# Patient Record
(demographics unavailable — no encounter records)

---

## 2024-11-13 NOTE — DATA REVIEWED
[FreeTextEntry1] : I reviewed and interpreted the sleep study results and reviewed them with the patient and family My interpretation is in keeping with  Sleep apnea present: yes Type: Predominantly obstructive  Degree:severe, AHI 15

## 2024-11-13 NOTE — CONSULT LETTER
[Dear  ___] : Dear  [unfilled], [Consult Letter:] : I had the pleasure of evaluating your patient, [unfilled]. [Consult Closing:] : Thank you very much for allowing me to participate in the care of this patient.  If you have any questions, please do not hesitate to contact me. [Sincerely,] : Sincerely, [FreeTextEntry2] : Micha Arora MD [FreeTextEntry3] : Nguyễn Acosta MD      Pediatric Otolaryngology      [address] [address]      Tel [phone]      Fax [phone]

## 2024-11-13 NOTE — HISTORY OF PRESENT ILLNESS
[No Personal or Family History of Easy Bruising, Bleeding, or Issues with General Anesthesia] : No Personal or Family History of easy bruising, bleeding, or issues with general anesthesia [No change in the review of systems as noted in prior visit date ___] : No change in the review of systems as noted in prior visit date of [unfilled] [de-identified] : 2 year old male here for f/u visit for snoring/sleep apnea (third opinion)  Seen by Dr. Grover in August, 2024,  prior  Hx of ETD, audiogram 5/15/24 WNL with tymps type C AD, type A AS History of snoring every night for 3-4 months Witnessed pausing while sleeping. No choking or gasping Remains restless and wakes at night Mouth breathing No daytime symptoms Trial of Flonase x few weeks with no improvement in symptoms Sleep study performed recently No recent ear infections No concerns for hearing or speech Passed [facility]   [de-identified] : August 2024

## 2024-11-13 NOTE — PHYSICAL EXAM
[Exposed Vessel] : left anterior vessel not exposed [Clear to Auscultation] : lungs were clear to auscultation bilaterally [Wheezing] : no wheezing [Increased Work of Breathing] : no increased work of breathing with use of accessory muscles and retractions [Normal Gait and Station] : normal gait and station [Normal muscle strength, symmetry and tone of facial, head and neck musculature] : normal muscle strength, symmetry and tone of facial, head and neck musculature [Normal] : no cervical lymphadenopathy [de-identified] : effusion

## 2025-03-05 NOTE — ASSESSMENT
[FreeTextEntry1] : Rash  new diagnosis of uncertain prognosis favor viral exanthem if persistent, ddx includes [place] vs LP vs PLC vs PsO vs other start triamcinolone 0.1% ointment BID to AA on body PRN roughness, SED  RTC 2 mo if not improved by then  Xerosis dry skin care reviewed, handout provided. Switch to recommended products in handout and moisturize liberally. recommend applying liberal amounts of plain vaseline to cheeks, especially before and after meals avoid wipes to the face after meals can cleanse with water and pat dry with a clean towel/cloth  #pruritus on occiput ok to use HC lotion BID prn pruritus, SED  NOT DISCUSSED #pigmentary mosaicism with #CALM # Melanocytic nevi brown patches on trunk and arm may represent pigmentary mosaicism, large cafe au lait macule (can be segmental or broad as seen with Uday Addison Sx), genetic testing for Legius and NF1 negative Father has multiple dark appearing nevi on his hands (photo in chart) so there may be genetic predisposition Favor continue observation at this time parents to notify if any changes or more prominent features arise clinical and dermoscopic photos in chart  # congenital dermal melanocytosis Discussed nature, chronicity and unpredictable course Reassured

## 2025-03-05 NOTE — CONSULT LETTER
[Dear  ___] : Dear  [unfilled], [Consult Letter:] : I had the pleasure of evaluating your patient, [unfilled]. [Please see my note below.] : Please see my note below. [Consult Closing:] : Thank you very much for allowing me to participate in the care of this patient.  If you have any questions, please do not hesitate to contact me. [Sincerely,] : Sincerely, [FreeTextEntry3] : Michelle Grider MD Department of Dermatology [facility]

## 2025-03-05 NOTE — PHYSICAL EXAM
[FreeTextEntry3] : numerous skin colored papules on trunk, extremities, few on cheeks xerosis occiput clear  brown patches on trunk and upper L arm  w some darker brown patches and macules within multiple (>6) cafe au lait macules on trunk and extremities blue grey macules on buttock and L upper shoulder.

## 2025-03-05 NOTE — ASSESSMENT
[FreeTextEntry1] : Rash  new diagnosis of uncertain prognosis favor viral exanthem if persistent, ddx includes [place] vs LP vs PLC vs PsO vs other start triamcinolone 0.1% ointment BID to AA on body PRN roughness, SED  RTC 2 mo if not improved by then  Xerosis dry skin care reviewed, handout provided. Switch to recommended products in handout and moisturize liberally. recommend applying liberal amounts of plain vaseline to cheeks, especially before and after meals avoid wipes to the face after meals can cleanse with water and pat dry with a clean towel/cloth  #pruritus on occiput ok to use HC lotion BID prn pruritus, SED  NOT DISCUSSED #pigmentary mosaicism with #CALM # Melanocytic nevi brown patches on trunk and arm may represent pigmentary mosaicism, large cafe au lait macule (can be segmental or broad as seen with Uday [place] Sx), genetic testing for Legius and NF1 negative Father has multiple dark appearing nevi on his hands (photo in chart) so there may be genetic predisposition Favor continue observation at this time parents to notify if any changes or more prominent features arise clinical and dermoscopic photos in chart  # congenital dermal melanocytosis Discussed nature, chronicity and unpredictable course Reassured

## 2025-03-05 NOTE — CONSULT LETTER
[Dear  ___] : Dear  [unfilled], [Consult Letter:] : I had the pleasure of evaluating your patient, [unfilled]. [Please see my note below.] : Please see my note below. [Consult Closing:] : Thank you very much for allowing me to participate in the care of this patient.  If you have any questions, please do not hesitate to contact me. [Sincerely,] : Sincerely, [FreeTextEntry3] : Michelle Grider MD Department of Dermatology [facility]

## 2025-03-05 NOTE — HISTORY OF PRESENT ILLNESS
[FreeTextEntry1] : np rash [de-identified] : Referred by: Dr. Arora   Mr. NOAH MUKHERJEE  is a 2 year old M here for evaluation of below  #rash since last thurs, asx, maybe slowly improving home, no  not sick recently, no fever, no rhinorrhea, cough  #dry skin on cheeks, itchy on back of head  S: dr kapoor M:  aquaphor D: gain, no fs/ds/wb/fb   no personal or family h/o skin cancer derm hx: brown spots on body since birth, developing well. No new lesions. Developing normally- meeting milestones per parents had genetic testing for neurofibromatosis and Legius which were negative

## 2025-03-05 NOTE — HISTORY OF PRESENT ILLNESS
[FreeTextEntry1] : np rash [de-identified] : Referred by: Dr. Arora   Mr. NOAH MUKHERJEE  is a 2 year old M here for evaluation of below  #rash since last thurs, asx, maybe slowly improving home, no  not sick recently, no fever, no rhinorrhea, cough  #dry skin on cheeks, itchy on back of head  S: dr kapoor M:  aquaphor D: gain, no fs/ds/wb/fb   no personal or family h/o skin cancer derm hx: brown spots on body since birth, developing well. No new lesions. Developing normally- meeting milestones per parents had genetic testing for neurofibromatosis and Legius which were negative

## 2025-03-25 NOTE — REVIEW OF SYSTEMS
[Negative] : Heme/Lymph [de-identified] : As per HPI [de-identified] : As per HPI [de-identified] : As per HPI

## 2025-03-25 NOTE — PHYSICAL EXAM
[3+] : 3+ [Normal muscle strength, symmetry and tone of facial, head and neck musculature] : normal muscle strength, symmetry and tone of facial, head and neck musculature [Normal] : the right membrane was normal [Increased Work of Breathing] : no increased work of breathing with use of accessory muscles and retractions

## 2025-03-25 NOTE — HISTORY OF PRESENT ILLNESS
[de-identified] : Miguel is a 2 year old here for follow up for snoring. History was obtained from patient, mother and chart. Referred by Dr. Dunne  PSG 9/4/2024 Moderate PSA worse in REM. Sleep efficiency 96%, AHI 7.7/hr, REM AHI 15.2/hr, sri 91%  audiogram 5/15/24 WNL with tymps type C AD, type A AS  Has T&A BMT scheduled with Dr. Acosta in May  [de-identified] : S/p Flonase for 3 months - minimal change since usage. Snoring every night. Witnessed pausing while sleeping. No choking or gasping No daytime symptoms.  Recurrent effusion at PCP  No recent ear infections Frequent tugging on his right ear at night for a month No concerns for hearing or speech Passed [facility] No chronic nasal congestion Mouth breather during the day No recent throat infections.

## 2025-03-25 NOTE — ASSESSMENT
[FreeTextEntry1] : NOAH is a 2 year old boy presenting for obstructive sleep apnea and eustachian tube dysfunction  [place] [facility] [place]   Obstructive Sleep Apnea  - Sleep study: moderate ROCKY worse in REM, AHI 7.7/hr, REM AHI 15.2/hr, sri 91% 9/4/2024  - Recommendation: Surgery - Indication for postoperative admission: Yes  - Need for postoperative sleep study: No   Eustachian Tube Dysfunction  - Discussed option for observation, reevaluation of fluid to see if resolution after 3 months of onset or myringotomy with tubes.  - audiogram reviewed as above  - Plan: Observation  Education: Obstructive sleep apnea is a condition where there are there is a cessation of breathing due to upper airway obstruction during sleep. It can be caused by a number of anatomic issues including, but not limited to tonsillar hypertrophy, increased weight, nasal obstruction and airway issues. There can be snoring, but this may be normal. Sleep apnea can be suggested by history, but a sleep study is needed to diagnose it. Options include observation and correction of the anatomic abnormality, weight loss if weight is contributory.    T&A Consent for Tonsillectomy and Adenoidectomy The risks, benefits and alternatives of tonsillectomy and adenoidectomy were discussed.    The risks of tonsillectomy include but are not limited to: bleeding, which can range from mild requiring observation to more serious or life-threatening bleeding necessitating hospitalization, blood transfusion, and/or return to the operating room for control; voice change, infection, pain, dehydration, swallowing difficulty, need for additional surgery, nasal regurgitation and risk of anesthesia (which will be discussed by the anesthesiologist). Benefits in the case of recurrent tonsillopharyngitis include a reduction (but not necessarily a complete cure) in the number of throat infections, and in the case of obstructive sleep apnea (ROCKY) include a decrease in severity of ROCKY, which can be curative, but in many cases residual ROCKY may occur. Alternatives in the case of recurrent tonsillopharyngitis include observation and continued antibiotic treatment, and in the case of ROCKY observation, medical therapy, Continuous Positive Airway Pressure(CPAP), Bilevel Positive Airway Pressure (BiPAP) other surgical options. Non-treatment of ROCKY is associated with decreased sleep and its sequelae, and in severe cases can have cardiovascular complications.  The risks, benefits and alternatives of adenoidectomy were discussed. The risks include but are not limited to: bleeding, which can range from mild requiring observation to more serious necessitating hospitalization, blood transfusion, return to the operating room for control and in extreme cases death; voice change- specifically velopharyngeal insufficiency which can affect the nasal resonance; infection, pain, dehydration, swallowing difficulty, need for additional surgery, nasal regurgitation and risk of anesthesia (which will be discussed by the anesthesiologist). Benefits in the case of recurrent adenoiditis include a reduction (but not necessarily a complete cure) in the number of adenoid infections; in the case of nasal obstruction an improvement of nasal airway and decreased rhinorrhea; and in the case of obstructive sleep apnea (ROCKY) include a decrease in severity of ROCKY, which can be curative, but in many cases residual ROCKY may occur. Alternatives in the case of recurrent adenoiditis include observation and continued antibiotic treatment; in the case of nasal obstruction observation or medical therapy including but not limited to antihistamines, intranasal/systemic steroids; and in the case of ROCKY observation, medical therapy, Continuous Positive Airway Pressure(CPAP), Bilevel Positive Airway Pressure (BiPAP) other surgical options. Non-treatment of ROCKY is associated with decreased sleep and its sequelae, and in severe cases can have cardiovascular complications.    Options/risks/benefits for intracapsular vs extracapsular tonsillectomy were discussed with the family.

## 2025-03-25 NOTE — CONSULT LETTER
[Dear  ___] : Dear  [unfilled], [Courtesy Letter:] : I had the pleasure of seeing your patient, [unfilled], in my office today. [Consult Closing:] : Thank you very much for allowing me to participate in the care of this patient.  If you have any questions, please do not hesitate to contact me. [Sincerely,] : Sincerely, [FreeTextEntry2] : Micha Arora MD [address] [place] [address] [id number] [phone]

## 2025-05-13 NOTE — ASSESSMENT
[FreeTextEntry1] : favor follicular eczema #atopic dermatitis, flaring with #xerosis discussed nature, chronicity and unpredictable course dry skin care reviewed, handout provided. Switch to recommended products in handout and moisturize liberally. - start hydrocortisone 2.5% ointment BID to AA of rough skin on face PRN roughness - start triamcinolone 0.1% ointment BID to AA of rough skin on trunk PRN roughness Reviewed SE of topical steroids including skin thinning and discoloration and discussed avoidance of prolonged use.  RTC if no improvement with above   NOT DISCUSSED #pigmentary mosaicism with #CALM # Melanocytic nevi brown patches on trunk and arm may represent pigmentary mosaicism, large cafe au lait macule (can be segmental or broad as seen with Uday [place] Sx), genetic testing for Legius and NF1 negative Father has multiple dark appearing nevi on his hands (photo in chart) so there may be genetic predisposition Favor continue observation at this time parents to notify if any changes or more prominent features arise clinical and dermoscopic photos in chart

## 2025-05-13 NOTE — CONSULT LETTER
[Dear  ___] : Dear  [unfilled], [Consult Letter:] : I had the pleasure of evaluating your patient, [unfilled]. [Please see my note below.] : Please see my note below. [Consult Closing:] : Thank you very much for allowing me to participate in the care of this patient.  If you have any questions, please do not hesitate to contact me. [Sincerely,] : Sincerely, [FreeTextEntry3] : Michelle Grider MD Department of Dermatology [facility]

## 2025-05-13 NOTE — PHYSICAL EXAM
[FreeTextEntry3] : numerous skin colored papules on trunk,  improved on extremities, eczematous patches on cheeks xerosis  brown patches on trunk and upper L arm  w some darker brown patches and macules within multiple (>6) cafe au lait macules on trunk and extremities blue grey macules on buttock and L upper shoulder.

## 2025-05-13 NOTE — HISTORY OF PRESENT ILLNESS
[FreeTextEntry1] : np rash [de-identified] : Referred by: Dr. Arora   Mr. NOAH MUKEHRJEE  is a 2 year old M here for evaluation of below  #rash on body  x months, improving on legs where applied TAC. didn't apply much to rest of body bc the rash was diffuse S: cerave baby M: cerave oint D: tide hx from 3/3035:  since last thurs, asx, maybe slowly improving home, no  not sick recently, no fever, no rhinorrhea, cough S: dr kapoor M:  aquaphor D: gain, no fs/ds/wb/fb   no personal or family h/o skin cancer derm hx: brown spots on body since birth, developing well. No new lesions. Developing normally- meeting milestones per parents had genetic testing for neurofibromatosis and Legius which were negative

## 2025-06-13 NOTE — DATA REVIEWED
[FreeTextEntry1] : 1. history obtained from primary caregiver as independent historian due to patient's developmental age and limited recall  2. prior notes reviewed

## 2025-06-13 NOTE — HISTORY OF PRESENT ILLNESS
[de-identified] : 1 y/o M presents for allergic rhinitis   Doing well but as of past week or two has had dry cough, runny nose, itchy eyes  Nerver allergy tested  s/p Bilateral exam under anesthesia and adenotonsillectomy 05/19/25  Snoring resolved. Still mouth breathing. No ear infections or s/s of pain  Recent dry cough, mom recently tested positive for strep.

## 2025-06-13 NOTE — REASON FOR VISIT
[Post-Operative Visit] : a post-operative visit for [Mother] : mother [FreeTextEntry2] : Bilateral exam under anesthesia and adenotonsillectomy 05/19/25

## 2025-07-03 NOTE — CONSULT LETTER
[Dear  ___] : Dear  [unfilled], [Consult Letter:] : I had the pleasure of evaluating your patient, [unfilled]. [Please see my note below.] : Please see my note below. [Consult Closing:] : Thank you very much for allowing me to participate in the care of this patient.  If you have any questions, please do not hesitate to contact me. [Sincerely,] : Sincerely, [FreeTextEntry3] : Michelle Grider MD Department of Dermatology [facility]

## 2025-07-03 NOTE — PHYSICAL EXAM
[FreeTextEntry3] : multiple 1mm pink papules on arms, legs, pink papules on b/l soles, none on palms xerosis mouth clear  few crusted pink papules on legs  brown patches on trunk and upper L arm  w some darker brown patches and macules within multiple (>6) cafe au lait macules on trunk and extremities blue grey macules on buttock and L upper shoulder.

## 2025-07-03 NOTE — HISTORY OF PRESENT ILLNESS
[FreeTextEntry1] : np rash [de-identified] : Referred by: Dr. Arora   Mr. NOAH MUKHERJEE  is a 2 year old M here for evaluation of below #bumps on arms and legs x 1 week, asx mom denies any recent illness/rhinorrhea/cough/fevers/mouth pain denies any recent travel denies any itchy household contacts  S: cerave baby M: cerave oint D: tide  hx: eczematous rashes S: dr kapoor M:  aquaphor D: gain, no fs/ds/wb/fb   no personal or family h/o skin cancer derm hx: brown spots on body since birth, developing well. No new lesions. Developing normally- meeting milestones per parents had genetic testing for neurofibromatosis and Legius which were negative

## 2025-07-03 NOTE — ASSESSMENT
[FreeTextEntry1] : favor viral exanthem likely mild coxsackie discussed expectations reassured TAC if pruritic  RTC 4 weeks if not resolved by then  arthropod assault Triamcinolone 0.1% ointment BID, for no longer than 2 weeks at a time, SEDf  NOT DISCUSSED #pigmentary mosaicism with #CALM # Melanocytic nevi brown patches on trunk and arm may represent pigmentary mosaicism, large cafe au lait macule (can be segmental or broad as seen with Uday Edmar Sx), genetic testing for Legius and NF1 negative Father has multiple dark appearing nevi on his hands (photo in chart) so there may be genetic predisposition Favor continue observation at this time parents to notify if any changes or more prominent features arise clinical and dermoscopic photos in chart